# Patient Record
Sex: MALE | Race: WHITE | NOT HISPANIC OR LATINO | URBAN - METROPOLITAN AREA
[De-identification: names, ages, dates, MRNs, and addresses within clinical notes are randomized per-mention and may not be internally consistent; named-entity substitution may affect disease eponyms.]

---

## 2021-06-08 ENCOUNTER — APPOINTMENT (RX ONLY)
Dept: URBAN - METROPOLITAN AREA CLINIC 93 | Facility: CLINIC | Age: 36
Setting detail: DERMATOLOGY
End: 2021-06-08

## 2021-06-08 DIAGNOSIS — B07.0 PLANTAR WART: ICD-10-CM | Status: WORSENING

## 2021-06-08 PROCEDURE — ? TREATMENT REGIMEN

## 2021-06-08 PROCEDURE — ? COUNSELING

## 2021-06-08 PROCEDURE — 99204 OFFICE O/P NEW MOD 45 MIN: CPT

## 2021-06-08 ASSESSMENT — LOCATION DETAILED DESCRIPTION DERM: LOCATION DETAILED: RIGHT PLANTAR FOREFOOT OVERLYING 2ND METATARSAL

## 2021-06-08 ASSESSMENT — LOCATION ZONE DERM: LOCATION ZONE: FEET

## 2021-06-08 ASSESSMENT — LOCATION SIMPLE DESCRIPTION DERM: LOCATION SIMPLE: RIGHT PLANTAR SURFACE

## 2021-06-08 NOTE — PROCEDURE: TREATMENT REGIMEN
Initiate Treatment: Wart compound occluded with Kt tape, QD.
Detail Level: Zone
Plan: 10 week wart fu.

## 2021-06-08 NOTE — HPI: WARTS ([email or web address])
Is This A New Presentation, Or A Follow-Up?: Wart
Additional History: Patient notes he is going to [place] on June 19, 2021.